# Patient Record
Sex: MALE | Race: WHITE | NOT HISPANIC OR LATINO | Employment: PART TIME | URBAN - METROPOLITAN AREA
[De-identification: names, ages, dates, MRNs, and addresses within clinical notes are randomized per-mention and may not be internally consistent; named-entity substitution may affect disease eponyms.]

---

## 2017-02-27 ENCOUNTER — CLINICAL SUPPORT NO REQUIREMENTS (OUTPATIENT)
Dept: CARDIOLOGY | Facility: CLINIC | Age: 82
End: 2017-02-27

## 2017-02-27 DIAGNOSIS — Z95.0 PACEMAKER: Primary | ICD-10-CM

## 2017-02-27 PROCEDURE — 93288 INTERROG EVL PM/LDLS PM IP: CPT | Performed by: INTERNAL MEDICINE

## 2017-06-07 ENCOUNTER — CLINICAL SUPPORT NO REQUIREMENTS (OUTPATIENT)
Dept: CARDIOLOGY | Facility: CLINIC | Age: 82
End: 2017-06-07

## 2017-06-07 DIAGNOSIS — Z45.018 ADJUSTMENT AND MANAGEMENT OF CARDIAC PACEMAKER: Primary | ICD-10-CM

## 2017-06-07 PROCEDURE — 93279 PRGRMG DEV EVAL PM/LDLS PM: CPT | Performed by: INTERNAL MEDICINE

## 2017-09-06 ENCOUNTER — CLINICAL SUPPORT NO REQUIREMENTS (OUTPATIENT)
Dept: CARDIOLOGY | Facility: CLINIC | Age: 82
End: 2017-09-06

## 2017-09-06 DIAGNOSIS — Z95.0 CARDIAC PACEMAKER: Primary | ICD-10-CM

## 2017-09-06 PROCEDURE — 93288 INTERROG EVL PM/LDLS PM IP: CPT | Performed by: INTERNAL MEDICINE

## 2017-12-18 ENCOUNTER — CLINICAL SUPPORT NO REQUIREMENTS (OUTPATIENT)
Dept: CARDIOLOGY | Facility: CLINIC | Age: 82
End: 2017-12-18

## 2017-12-18 ENCOUNTER — OFFICE VISIT (OUTPATIENT)
Dept: CARDIOLOGY | Facility: CLINIC | Age: 82
End: 2017-12-18

## 2017-12-18 VITALS
WEIGHT: 206 LBS | DIASTOLIC BLOOD PRESSURE: 63 MMHG | HEART RATE: 60 BPM | HEIGHT: 70 IN | BODY MASS INDEX: 29.49 KG/M2 | SYSTOLIC BLOOD PRESSURE: 110 MMHG

## 2017-12-18 DIAGNOSIS — Z95.0 PACEMAKER: Primary | ICD-10-CM

## 2017-12-18 DIAGNOSIS — I10 ESSENTIAL HYPERTENSION: ICD-10-CM

## 2017-12-18 DIAGNOSIS — E78.5 HYPERLIPIDEMIA, UNSPECIFIED HYPERLIPIDEMIA TYPE: ICD-10-CM

## 2017-12-18 DIAGNOSIS — Z95.0 CARDIAC PACEMAKER: Primary | ICD-10-CM

## 2017-12-18 PROCEDURE — 99213 OFFICE O/P EST LOW 20 MIN: CPT | Performed by: INTERNAL MEDICINE

## 2017-12-18 PROCEDURE — 93000 ELECTROCARDIOGRAM COMPLETE: CPT | Performed by: INTERNAL MEDICINE

## 2017-12-18 PROCEDURE — 93288 INTERROG EVL PM/LDLS PM IP: CPT | Performed by: INTERNAL MEDICINE

## 2018-03-19 ENCOUNTER — CLINICAL SUPPORT NO REQUIREMENTS (OUTPATIENT)
Dept: CARDIOLOGY | Facility: CLINIC | Age: 83
End: 2018-03-19

## 2018-03-19 DIAGNOSIS — Z95.0 PACEMAKER: Primary | ICD-10-CM

## 2018-03-19 PROCEDURE — 93288 INTERROG EVL PM/LDLS PM IP: CPT | Performed by: INTERNAL MEDICINE

## 2018-07-02 ENCOUNTER — CLINICAL SUPPORT NO REQUIREMENTS (OUTPATIENT)
Dept: CARDIOLOGY | Facility: CLINIC | Age: 83
End: 2018-07-02

## 2018-07-02 DIAGNOSIS — Z95.0 PACEMAKER: Primary | ICD-10-CM

## 2018-07-02 PROCEDURE — 93288 INTERROG EVL PM/LDLS PM IP: CPT | Performed by: INTERNAL MEDICINE

## 2018-12-17 ENCOUNTER — OFFICE VISIT (OUTPATIENT)
Dept: CARDIOLOGY | Facility: CLINIC | Age: 83
End: 2018-12-17

## 2018-12-17 ENCOUNTER — CLINICAL SUPPORT NO REQUIREMENTS (OUTPATIENT)
Dept: CARDIOLOGY | Facility: CLINIC | Age: 83
End: 2018-12-17

## 2018-12-17 VITALS
HEIGHT: 70 IN | BODY MASS INDEX: 28.63 KG/M2 | WEIGHT: 200 LBS | SYSTOLIC BLOOD PRESSURE: 151 MMHG | DIASTOLIC BLOOD PRESSURE: 71 MMHG | HEART RATE: 65 BPM

## 2018-12-17 DIAGNOSIS — Z95.0 PACEMAKER: Primary | ICD-10-CM

## 2018-12-17 DIAGNOSIS — Z95.0 PACEMAKER: ICD-10-CM

## 2018-12-17 DIAGNOSIS — I48.0 PAROXYSMAL ATRIAL FIBRILLATION (HCC): ICD-10-CM

## 2018-12-17 DIAGNOSIS — I34.0 NON-RHEUMATIC MITRAL REGURGITATION: Primary | ICD-10-CM

## 2018-12-17 DIAGNOSIS — I10 ESSENTIAL HYPERTENSION: ICD-10-CM

## 2018-12-17 PROCEDURE — 93288 INTERROG EVL PM/LDLS PM IP: CPT | Performed by: INTERNAL MEDICINE

## 2018-12-17 PROCEDURE — 93000 ELECTROCARDIOGRAM COMPLETE: CPT | Performed by: INTERNAL MEDICINE

## 2018-12-17 PROCEDURE — 99213 OFFICE O/P EST LOW 20 MIN: CPT | Performed by: INTERNAL MEDICINE

## 2018-12-17 RX ORDER — ALLOPURINOL 100 MG/1
100 TABLET ORAL DAILY
COMMUNITY

## 2018-12-17 RX ORDER — POTASSIUM CHLORIDE 750 MG/1
10 CAPSULE, EXTENDED RELEASE ORAL 2 TIMES DAILY
COMMUNITY

## 2018-12-17 RX ORDER — MULTIPLE VITAMINS W/ MINERALS TAB 9MG-400MCG
1 TAB ORAL DAILY
COMMUNITY

## 2018-12-17 NOTE — PROGRESS NOTES
Subjective:        Abundio Philip is a 84 y.o. male who here for follow up    CC  The follow-up for the atrial fibrillation hypertension mitral regurgitation and pacemaker  HPI  84-year-old male with a known history of paroxysmal atrial fibrillation, benign essential arterial hypertension mitral regurgitation and pacemaker here for the follow-up    Abundio Philip  here for follow up with no complaints of chest pain, sob, palpitation, syncope, near syncope  No side effects with current meds  No pnd, orthopnea       Problem List Items Addressed This Visit        Cardiovascular and Mediastinum    Paroxysmal atrial fibrillation (CMS/HCC)    Hypertension    Mitral regurgitation - Primary    Relevant Orders    Adult Transthoracic Echo Complete W/ Cont if Necessary Per Protocol    Pacemaker        .    The following portions of the patient's history were reviewed and updated as appropriate: allergies, current medications, past family history, past medical history, past social history, past surgical history and problem list.    Past Medical History:   Diagnosis Date   • Amputation of finger tip    • Arthritis    • Atrial fibrillation (CMS/HCC)    • Chicken pox    • Hearing loss    • HLD (hyperlipidemia)    • HTN (hypertension)    • Murmur    • Numbness    • Obesity    • Restless leg syndrome    • Shortness of breath    • Varicose veins      reports that he quit smoking about 60 years ago. He started smoking about 66 years ago. He has a 1.50 pack-year smoking history. he has never used smokeless tobacco. He reports that he drinks about 0.6 oz of alcohol per week. He reports that he does not use drugs.   Family History   Problem Relation Age of Onset   • Stroke Mother    • Hyperlipidemia Mother    • Hypertension Mother    • Stroke Father    • Hypertension Father    • Hyperlipidemia Father        Review of Systems  Constitutional: No wt loss, fever, fatigue  Gastrointestinal: No nausea, abdominal pain  Behavioral/Psych: No  "insomnia or anxiety   Cardiovascular no chest pains or tightness in chest  Objective:                 Physical Exam  /71   Pulse 65   Ht 177.8 cm (70\")   Wt 90.7 kg (200 lb)   BMI 28.70 kg/m²     General appearance: NAD, conversant   Eyes: anicteric sclerae, moist conjunctivae; no lid-lag; PERRLA   HENT: Atraumatic; oropharynx clear with moist mucous membranes and no mucosal ulcerations;  normal hard and soft palate   Neck: Trachea midline; FROM, supple, no thyromegaly or lymphadenopathy   Lungs: CTA, with normal respiratory effort and no intercostal retractions   CV: S1-S2 regular, no murmurs, no rub, no gallop   Abdomen: Soft, non-tender; no masses or HSM   Extremities: No peripheral edema or extremity lymphadenopathy  Skin: Normal temperature, turgor and texture; no rash, ulcers or subcutaneous nodules   Psych: Appropriate affect, alert and oriented to person, place and time             ECG 12 Lead  Date/Time: 2018 10:00 AM  Performed by: Fritz Child MD  Authorized by: Fritz Child MD   Comparison: compared with previous ECG   Similar to previous ECG  Pacin% capture  Clinical impression: abnormal ECG              Echocardiogram:        Current Outpatient Medications:   •  allopurinol (ZYLOPRIM) 100 MG tablet, Take 100 mg by mouth Daily., Disp: , Rfl:   •  apixaban (ELIQUIS) 2.5 MG tablet tablet, Take 2.5 mg by mouth 2 (Two) Times a Day., Disp: , Rfl:   •  lovastatin (MEVACOR) 40 MG tablet, Take 40 mg by mouth every night., Disp: , Rfl:   •  metoprolol tartrate (LOPRESSOR) 25 MG tablet, Take 12.5 mg by mouth 2 (two) times a day., Disp: , Rfl:   •  Multiple Vitamins-Minerals (MULTIVITAMIN WITH MINERALS) tablet tablet, Take 1 tablet by mouth Daily., Disp: , Rfl:   •  potassium chloride (MICRO-K) 10 MEQ CR capsule, Take 10 mEq by mouth 2 (Two) Times a Day., Disp: , Rfl:   •  rOPINIRole (REQUIP) 0.25 MG tablet, Take 0.25 mg by mouth every night., Disp: , Rfl:   •  " valsartan-hydrochlorothiazide (DIOVAN-HCT) 160-12.5 MG per tablet, Take 1 tablet by mouth every night., Disp: , Rfl:    Assessment:        Patient Active Problem List   Diagnosis   • Paroxysmal atrial fibrillation (CMS/HCC)   • Hypertension   • Syncope   • Sick sinus syndrome (CMS/HCC)   • Hyperlipidemia   • Knee pain   • Osteoarthritis of knee   • History of total knee replacement   • Mitral regurgitation   • Tricuspid regurgitation   • 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 Non-rheumatic mitral regurgitation I34.0 424.0   2. Pacemaker Z95.0 V45.01   3. Paroxysmal atrial fibrillation (CMS/HCC) I48.0 427.31   4. Essential hypertension I10 401.9     1. Non-rheumatic mitral regurgitation  Considering patient's medical condition as well as the risk factors, patient will require echocardiogram for further evaluation for the LV function, four-chamber evaluation, including the pressures, valvular function and  pericardial disease and pericardial effusion    - Adult Transthoracic Echo Complete W/ Cont if Necessary Per Protocol; Future    2. Pacemaker  Functioning normally    3. Paroxysmal atrial fibrillation (CMS/HCC)  Controlled    4. Essential hypertension  Control       CV STABLE    SEE IN 1 YR  COUNSELING:    Abundio Donnelly was given to patient for the following topics: diagnostic results, risk factor reductions, impressions, risks and benefits of treatment options and importance of treatment compliance .       SMOKING COUNSELING:    Counseling given: Not Answered  Comment: in       Dictated using Dragon dictation

## 2019-03-18 ENCOUNTER — CLINICAL SUPPORT NO REQUIREMENTS (OUTPATIENT)
Dept: CARDIOLOGY | Facility: CLINIC | Age: 84
End: 2019-03-18

## 2019-03-18 DIAGNOSIS — Z95.0 PACEMAKER: Primary | ICD-10-CM

## 2019-03-18 PROCEDURE — 93288 INTERROG EVL PM/LDLS PM IP: CPT | Performed by: INTERNAL MEDICINE

## 2019-06-17 ENCOUNTER — CLINICAL SUPPORT NO REQUIREMENTS (OUTPATIENT)
Dept: CARDIOLOGY | Facility: CLINIC | Age: 84
End: 2019-06-17

## 2019-06-17 DIAGNOSIS — Z95.0 PACEMAKER: Primary | ICD-10-CM

## 2019-06-17 PROCEDURE — 93288 INTERROG EVL PM/LDLS PM IP: CPT | Performed by: INTERNAL MEDICINE

## 2019-09-16 ENCOUNTER — CLINICAL SUPPORT NO REQUIREMENTS (OUTPATIENT)
Dept: CARDIOLOGY | Facility: CLINIC | Age: 84
End: 2019-09-16

## 2019-09-16 DIAGNOSIS — Z95.0 PACEMAKER: Primary | ICD-10-CM

## 2019-09-16 PROCEDURE — 93288 INTERROG EVL PM/LDLS PM IP: CPT | Performed by: INTERNAL MEDICINE

## 2019-12-11 ENCOUNTER — HOSPITAL ENCOUNTER (OUTPATIENT)
Dept: CARDIOLOGY | Facility: HOSPITAL | Age: 84
Discharge: HOME | End: 2019-12-11
Admitting: INTERNAL MEDICINE

## 2019-12-11 VITALS
BODY MASS INDEX: 28.63 KG/M2 | WEIGHT: 200 LBS | DIASTOLIC BLOOD PRESSURE: 73 MMHG | HEART RATE: 76 BPM | SYSTOLIC BLOOD PRESSURE: 132 MMHG | HEIGHT: 70 IN

## 2019-12-11 DIAGNOSIS — I34.0 NON-RHEUMATIC MITRAL REGURGITATION: ICD-10-CM

## 2019-12-11 PROCEDURE — 93306 TTE W/DOPPLER COMPLETE: CPT | Performed by: INTERNAL MEDICINE

## 2019-12-11 PROCEDURE — 93306 TTE W/DOPPLER COMPLETE: CPT

## 2019-12-12 LAB
AORTIC DIMENSIONLESS INDEX: 0.7 (DI)
BH CV ECHO MEAS - ACS: 2 CM
BH CV ECHO MEAS - AI DEC SLOPE: 197 CM/SEC^2
BH CV ECHO MEAS - AI MAX PG: 37.5 MMHG
BH CV ECHO MEAS - AI MAX VEL: 306 CM/SEC
BH CV ECHO MEAS - AI P1/2T: 455 MSEC
BH CV ECHO MEAS - AO MAX PG (FULL): 4.7 MMHG
BH CV ECHO MEAS - AO MAX PG: 9.2 MMHG
BH CV ECHO MEAS - AO MEAN PG (FULL): 2 MMHG
BH CV ECHO MEAS - AO MEAN PG: 4 MMHG
BH CV ECHO MEAS - AO ROOT AREA (BSA CORRECTED): 1.5
BH CV ECHO MEAS - AO ROOT AREA: 6.6 CM^2
BH CV ECHO MEAS - AO ROOT DIAM: 2.9 CM
BH CV ECHO MEAS - AO V2 MAX: 152 CM/SEC
BH CV ECHO MEAS - AO V2 MEAN: 95.1 CM/SEC
BH CV ECHO MEAS - AO V2 VTI: 29 CM
BH CV ECHO MEAS - AVA(I,A): 2.8 CM^2
BH CV ECHO MEAS - AVA(I,D): 2.8 CM^2
BH CV ECHO MEAS - AVA(V,A): 2.7 CM^2
BH CV ECHO MEAS - AVA(V,D): 2.7 CM^2
BH CV ECHO MEAS - BSA(HAYCOCK): 2 M^2
BH CV ECHO MEAS - BSA: 1.9 M^2
BH CV ECHO MEAS - BZI_BMI: 37.8 KILOGRAMS/M^2
BH CV ECHO MEAS - BZI_METRIC_HEIGHT: 154.9 CM
BH CV ECHO MEAS - BZI_METRIC_WEIGHT: 90.7 KG
BH CV ECHO MEAS - EDV(CUBED): 85.2 ML
BH CV ECHO MEAS - EDV(MOD-SP2): 133 ML
BH CV ECHO MEAS - EDV(MOD-SP4): 107 ML
BH CV ECHO MEAS - EDV(TEICH): 87.7 ML
BH CV ECHO MEAS - EF(CUBED): 79.4 %
BH CV ECHO MEAS - EF(MOD-BP): 61 %
BH CV ECHO MEAS - EF(MOD-SP2): 62.4 %
BH CV ECHO MEAS - EF(MOD-SP4): 57.9 %
BH CV ECHO MEAS - EF(TEICH): 71.9 %
BH CV ECHO MEAS - ESV(CUBED): 17.6 ML
BH CV ECHO MEAS - ESV(MOD-SP2): 50 ML
BH CV ECHO MEAS - ESV(MOD-SP4): 45 ML
BH CV ECHO MEAS - ESV(TEICH): 24.6 ML
BH CV ECHO MEAS - FS: 40.9 %
BH CV ECHO MEAS - IVS/LVPW: 1
BH CV ECHO MEAS - IVSD: 1.4 CM
BH CV ECHO MEAS - LAT PEAK E' VEL: 13.5 CM/SEC
BH CV ECHO MEAS - LV DIASTOLIC VOL/BSA (35-75): 56.6 ML/M^2
BH CV ECHO MEAS - LV MASS(C)D: 240.3 GRAMS
BH CV ECHO MEAS - LV MASS(C)DI: 127.2 GRAMS/M^2
BH CV ECHO MEAS - LV MAX PG: 4.5 MMHG
BH CV ECHO MEAS - LV MEAN PG: 2 MMHG
BH CV ECHO MEAS - LV SYSTOLIC VOL/BSA (12-30): 23.8 ML/M^2
BH CV ECHO MEAS - LV V1 MAX: 106 CM/SEC
BH CV ECHO MEAS - LV V1 MEAN: 72.7 CM/SEC
BH CV ECHO MEAS - LV V1 VTI: 21.4 CM
BH CV ECHO MEAS - LVIDD: 4.4 CM
BH CV ECHO MEAS - LVIDS: 2.6 CM
BH CV ECHO MEAS - LVLD AP2: 8.3 CM
BH CV ECHO MEAS - LVLD AP4: 7.4 CM
BH CV ECHO MEAS - LVLS AP2: 6.7 CM
BH CV ECHO MEAS - LVLS AP4: 6.3 CM
BH CV ECHO MEAS - LVOT AREA (M): 3.8 CM^2
BH CV ECHO MEAS - LVOT AREA: 3.8 CM^2
BH CV ECHO MEAS - LVOT DIAM: 2.2 CM
BH CV ECHO MEAS - LVPWD: 1.4 CM
BH CV ECHO MEAS - MED PEAK E' VEL: 4.8 CM/SEC
BH CV ECHO MEAS - MR MAX PG: 51.6 MMHG
BH CV ECHO MEAS - MR MAX VEL: 359 CM/SEC
BH CV ECHO MEAS - MV A MAX VEL: 47.5 CM/SEC
BH CV ECHO MEAS - MV DEC SLOPE: 284 CM/SEC^2
BH CV ECHO MEAS - MV DEC TIME: 240 SEC
BH CV ECHO MEAS - MV E MAX VEL: 93.6 CM/SEC
BH CV ECHO MEAS - MV E/A: 2
BH CV ECHO MEAS - MV MAX PG: 4.2 MMHG
BH CV ECHO MEAS - MV MEAN PG: 2 MMHG
BH CV ECHO MEAS - MV P1/2T MAX VEL: 117 CM/SEC
BH CV ECHO MEAS - MV P1/2T: 120.7 MSEC
BH CV ECHO MEAS - MV V2 MAX: 103 CM/SEC
BH CV ECHO MEAS - MV V2 MEAN: 57.3 CM/SEC
BH CV ECHO MEAS - MV V2 VTI: 31.9 CM
BH CV ECHO MEAS - MVA P1/2T LCG: 1.9 CM^2
BH CV ECHO MEAS - MVA(P1/2T): 1.8 CM^2
BH CV ECHO MEAS - MVA(VTI): 2.6 CM^2
BH CV ECHO MEAS - PA ACC TIME: 0.07 SEC
BH CV ECHO MEAS - PA MAX PG (FULL): 0.94 MMHG
BH CV ECHO MEAS - PA MAX PG: 4.9 MMHG
BH CV ECHO MEAS - PA PR(ACCEL): 45.7 MMHG
BH CV ECHO MEAS - PA V2 MAX: 111 CM/SEC
BH CV ECHO MEAS - PI END-D VEL: 86.9 CM/SEC
BH CV ECHO MEAS - PULM DIAS VEL: 55.7 CM/SEC
BH CV ECHO MEAS - PULM S/D: 0.86
BH CV ECHO MEAS - PULM SYS VEL: 48 CM/SEC
BH CV ECHO MEAS - PVA(V,A): 4.1 CM^2
BH CV ECHO MEAS - PVA(V,D): 4.1 CM^2
BH CV ECHO MEAS - QP/QS: 1
BH CV ECHO MEAS - RAP SYSTOLE: 8 MMHG
BH CV ECHO MEAS - RV MAX PG: 4 MMHG
BH CV ECHO MEAS - RV MEAN PG: 2 MMHG
BH CV ECHO MEAS - RV V1 MAX: 99.8 CM/SEC
BH CV ECHO MEAS - RV V1 MEAN: 61.9 CM/SEC
BH CV ECHO MEAS - RV V1 VTI: 18.7 CM
BH CV ECHO MEAS - RVOT AREA: 4.5 CM^2
BH CV ECHO MEAS - RVOT DIAM: 2.4 CM
BH CV ECHO MEAS - RVSP: 45 MMHG
BH CV ECHO MEAS - SI(AO): 101.4 ML/M^2
BH CV ECHO MEAS - SI(CUBED): 35.8 ML/M^2
BH CV ECHO MEAS - SI(LVOT): 43.1 ML/M^2
BH CV ECHO MEAS - SI(MOD-SP2): 43.9 ML/M^2
BH CV ECHO MEAS - SI(MOD-SP4): 32.8 ML/M^2
BH CV ECHO MEAS - SI(TEICH): 33.4 ML/M^2
BH CV ECHO MEAS - SV(AO): 191.6 ML
BH CV ECHO MEAS - SV(CUBED): 67.6 ML
BH CV ECHO MEAS - SV(LVOT): 81.3 ML
BH CV ECHO MEAS - SV(MOD-SP2): 83 ML
BH CV ECHO MEAS - SV(MOD-SP4): 62 ML
BH CV ECHO MEAS - SV(RVOT): 84.6 ML
BH CV ECHO MEAS - SV(TEICH): 63.1 ML
BH CV ECHO MEAS - TAPSE (>1.6): 2.2 CM
BH CV ECHO MEAS - TR MAX PG: 37
BH CV ECHO MEAS - TR MAX VEL: 302 CM/SEC
BH CV ECHO MEASUREMENTS AVERAGE E/E' RATIO: 10.23
BH CV XLRA - RV BASE: 4.1 CM
BH CV XLRA - TDI S': 14.4 CM/SEC
LEFT ATRIUM VOLUME INDEX: 69.8 ML/M2
MAXIMAL PREDICTED HEART RATE: 135 BPM
STRESS TARGET HR: 115 BPM

## 2019-12-23 ENCOUNTER — CLINICAL SUPPORT NO REQUIREMENTS (OUTPATIENT)
Dept: CARDIOLOGY | Facility: CLINIC | Age: 84
End: 2019-12-23

## 2019-12-23 ENCOUNTER — OFFICE VISIT (OUTPATIENT)
Dept: CARDIOLOGY | Facility: CLINIC | Age: 84
End: 2019-12-23

## 2019-12-23 VITALS
DIASTOLIC BLOOD PRESSURE: 74 MMHG | HEIGHT: 70 IN | HEART RATE: 76 BPM | SYSTOLIC BLOOD PRESSURE: 146 MMHG | BODY MASS INDEX: 29.78 KG/M2 | WEIGHT: 208 LBS

## 2019-12-23 DIAGNOSIS — Z95.0 PACEMAKER: Primary | ICD-10-CM

## 2019-12-23 DIAGNOSIS — I49.5 SICK SINUS SYNDROME (HCC): Primary | ICD-10-CM

## 2019-12-23 DIAGNOSIS — E78.5 HYPERLIPIDEMIA, UNSPECIFIED HYPERLIPIDEMIA TYPE: ICD-10-CM

## 2019-12-23 DIAGNOSIS — I10 ESSENTIAL HYPERTENSION: ICD-10-CM

## 2019-12-23 DIAGNOSIS — I48.0 PAROXYSMAL ATRIAL FIBRILLATION (HCC): ICD-10-CM

## 2019-12-23 DIAGNOSIS — Z95.0 PACEMAKER: ICD-10-CM

## 2019-12-23 PROCEDURE — 99213 OFFICE O/P EST LOW 20 MIN: CPT | Performed by: INTERNAL MEDICINE

## 2019-12-23 PROCEDURE — 93000 ELECTROCARDIOGRAM COMPLETE: CPT | Performed by: INTERNAL MEDICINE

## 2019-12-23 PROCEDURE — 93288 INTERROG EVL PM/LDLS PM IP: CPT | Performed by: INTERNAL MEDICINE

## 2019-12-23 RX ORDER — FUROSEMIDE 40 MG/1
TABLET ORAL
COMMUNITY
End: 2019-12-23

## 2019-12-23 RX ORDER — ACETAMINOPHEN AND CODEINE PHOSPHATE 300; 30 MG/1; MG/1
TABLET ORAL
COMMUNITY
End: 2019-12-23

## 2019-12-23 NOTE — PROGRESS NOTES
Subjective:        Abundio Philip is a 85 y.o. male who here for follow up    CC  The follow-up for paroxysmal atrial fibrillation hypertension  HPI  85-year-old male with paroxysmal atrial fibrillation, benign essential arterial hypertension, sick sinus syndrome pacemaker and hyperlipidemia here for the follow-up with no complaints of chest pains or tightness in the chest     Problem List Items Addressed This Visit        Cardiovascular and Mediastinum    Paroxysmal atrial fibrillation (CMS/HCC)    Hypertension    Sick sinus syndrome (CMS/HCC) - Primary    Hyperlipidemia    Pacemaker        .    The following portions of the patient's history were reviewed and updated as appropriate: allergies, current medications, past family history, past medical history, past social history, past surgical history and problem list.    Past Medical History:   Diagnosis Date   • Amputation of finger tip    • Arthritis    • Atrial fibrillation (CMS/HCC)    • Chicken pox    • Hearing loss    • HLD (hyperlipidemia)    • HTN (hypertension)    • Murmur    • Numbness    • Obesity    • Restless leg syndrome    • Shortness of breath    • Varicose veins      reports that he quit smoking about 61 years ago. He started smoking about 67 years ago. He has a 1.50 pack-year smoking history. He has never used smokeless tobacco. He reports that he drinks about 1.0 standard drinks of alcohol per week. He reports that he does not use drugs.   Family History   Problem Relation Age of Onset   • Stroke Mother    • Hyperlipidemia Mother    • Hypertension Mother    • Stroke Father    • Hypertension Father    • Hyperlipidemia Father        Review of Systems  Constitutional: No wt loss, fever, fatigue  Gastrointestinal: No nausea, abdominal pain  Behavioral/Psych: No insomnia or anxiety   Cardiovascular no chest pains or tightness in the chest  Objective:       Physical Exam  /74 (BP Location: Left arm, Patient Position: Sitting)   Pulse 76   Ht  "177.8 cm (70\")   Wt 94.3 kg (208 lb)   BMI 29.84 kg/m²   General appearance: No acute changes   Neck: Trachea midline; NECK, supple, no thyromegaly or lymphadenopathy   Lungs: Normal size and shape, normal breath sounds, equal distribution of air, no rales and rhonchi   CV: S1-S2 regular, no murmurs, no rub, no gallop   Abdomen: Soft, non-tender; no masses , no abnormal abdominal sounds   Extremities: No deformity , normal color , no peripheral edema   Skin: Normal temperature, turgor and texture; no rash, ulcers            ECG 12 Lead  Date/Time: 2019 11:01 AM  Performed by: Fritz Child MD  Authorized by: Fritz Child MD   Comparison: compared with previous ECG   Similar to previous ECG  Pacin% capture  Clinical impression: abnormal EKG              Echocardiogram:    Interpretation Summary     · There is calcification of the aortic valve.  · Moderate tricuspid valve regurgitation is present.  · Mild pulmonic valve regurgitation is present.  · Right ventricular cavity is moderately dilated.  · Left atrial cavity size is moderate-to-severely dilated.  · Calculated EF = 61.0%  · There is no evidence of pericardial effusion            Current Outpatient Medications:   •  allopurinol (ZYLOPRIM) 100 MG tablet, Take 100 mg by mouth Daily., Disp: , Rfl:   •  apixaban (ELIQUIS) 2.5 MG tablet tablet, Take 2.5 mg by mouth 2 (Two) Times a Day., Disp: , Rfl:   •  lovastatin (MEVACOR) 40 MG tablet, Take 40 mg by mouth every night., Disp: , Rfl:   •  metoprolol tartrate (LOPRESSOR) 25 MG tablet, Take 12.5 mg by mouth 2 (two) times a day., Disp: , Rfl:   •  Multiple Vitamins-Minerals (MULTIVITAMIN WITH MINERALS) tablet tablet, Take 1 tablet by mouth Daily., Disp: , Rfl:   •  potassium chloride (MICRO-K) 10 MEQ CR capsule, Take 10 mEq by mouth 2 (Two) Times a Day., Disp: , Rfl:   •  rOPINIRole (REQUIP) 0.25 MG tablet, Take 0.25 mg by mouth every night., Disp: , Rfl:   •  " valsartan-hydrochlorothiazide (DIOVAN-HCT) 160-12.5 MG per tablet, Take 1 tablet by mouth every night., Disp: , Rfl:    Assessment:        Patient Active Problem List   Diagnosis   • Paroxysmal atrial fibrillation (CMS/HCC)   • Hypertension   • Syncope   • Sick sinus syndrome (CMS/HCC)   • Hyperlipidemia   • Knee pain   • Osteoarthritis of knee   • History of total knee replacement   • Mitral regurgitation   • Tricuspid regurgitation   • 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 Sick sinus syndrome (CMS/HCC) I49.5 427.81   2. Paroxysmal atrial fibrillation (CMS/HCC) I48.0 427.31   3. Essential hypertension I10 401.9   4. Pacemaker Z95.0 V45.01   5. Hyperlipidemia, unspecified hyperlipidemia type E78.5 272.4     1. Sick sinus syndrome (CMS/HCC)  Pacemaker    2. Paroxysmal atrial fibrillation (CMS/HCC)  Controlled    3. Essential hypertension  Blood pressure controlled    4. Pacemaker  Pacemaker functioning normal    5. Hyperlipidemia, unspecified hyperlipidemia type  Continue current treatment       cv stable    See in 1 yr  COUNSELING:    Abundio Donnelly was given to patient for the following topics: diagnostic results, risk factor reductions, impressions, risks and benefits of treatment options and importance of treatment compliance .       SMOKING COUNSELING:    [unfilled]    Dictated using Dragon dictation

## 2020-07-20 ENCOUNTER — CLINICAL SUPPORT NO REQUIREMENTS (OUTPATIENT)
Dept: CARDIOLOGY | Facility: CLINIC | Age: 85
End: 2020-07-20

## 2020-07-20 DIAGNOSIS — Z45.018 PACEMAKER REPROGRAMMING/CHECK: Primary | ICD-10-CM

## 2020-07-20 PROCEDURE — 93279 PRGRMG DEV EVAL PM/LDLS PM: CPT | Performed by: INTERNAL MEDICINE

## 2022-10-28 PROCEDURE — 93294 REM INTERROG EVL PM/LDLS PM: CPT | Performed by: INTERNAL MEDICINE

## 2022-10-28 PROCEDURE — 93296 REM INTERROG EVL PM/IDS: CPT | Performed by: INTERNAL MEDICINE
